# Patient Record
Sex: MALE | Race: WHITE
[De-identification: names, ages, dates, MRNs, and addresses within clinical notes are randomized per-mention and may not be internally consistent; named-entity substitution may affect disease eponyms.]

---

## 2019-01-27 ENCOUNTER — HOSPITAL ENCOUNTER (EMERGENCY)
Dept: HOSPITAL 42 - ED | Age: 34
LOS: 1 days | Discharge: HOME | End: 2019-01-28
Payer: SELF-PAY

## 2019-01-27 VITALS — RESPIRATION RATE: 18 BRPM

## 2019-01-27 VITALS — BODY MASS INDEX: 25.8 KG/M2

## 2019-01-27 DIAGNOSIS — F10.129: Primary | ICD-10-CM

## 2019-01-27 DIAGNOSIS — Z23: ICD-10-CM
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
## 2019-01-27 DIAGNOSIS — S61.412A: ICD-10-CM

## 2019-01-27 LAB
ALBUMIN SERPL-MCNC: 4.4 G/DL (ref 3–4.8)
ALBUMIN/GLOB SERPL: 1.4 {RATIO} (ref 1.1–1.8)
ALT SERPL-CCNC: 35 U/L (ref 7–56)
AST SERPL-CCNC: 29 U/L (ref 17–59)
BASOPHILS # BLD AUTO: 0.04 K/MM3 (ref 0–2)
BASOPHILS NFR BLD: 0.5 % (ref 0–3)
BUN SERPL-MCNC: 12 MG/DL (ref 7–21)
CALCIUM SERPL-MCNC: 8.8 MG/DL (ref 8.4–10.5)
EOSINOPHIL # BLD: 0.4 10*3/UL (ref 0–0.7)
EOSINOPHIL NFR BLD: 4.2 % (ref 1.5–5)
ERYTHROCYTE [DISTWIDTH] IN BLOOD BY AUTOMATED COUNT: 11.6 % (ref 11.5–14.5)
GFR NON-AFRICAN AMERICAN: > 60
HGB BLD-MCNC: 15.3 G/DL (ref 14–18)
LYMPHOCYTES # BLD: 5.5 10*3/UL (ref 1.2–3.4)
LYMPHOCYTES NFR BLD AUTO: 63.3 % (ref 22–35)
MCH RBC QN AUTO: 31.3 PG (ref 25–35)
MCHC RBC AUTO-ENTMCNC: 35.7 G/DL (ref 31–37)
MCV RBC AUTO: 87.5 FL (ref 80–105)
MONOCYTES # BLD AUTO: 0.3 10*3/UL (ref 0.1–0.6)
MONOCYTES NFR BLD: 3.2 % (ref 1–6)
PLATELET # BLD: 297 10^3/UL (ref 120–450)
PMV BLD AUTO: 9.1 FL (ref 7–11)
RBC # BLD AUTO: 4.89 10^6/UL (ref 3.5–6.1)
WBC # BLD AUTO: 8.7 10^3/UL (ref 4.5–11)

## 2019-01-27 PROCEDURE — 80074 ACUTE HEPATITIS PANEL: CPT

## 2019-01-27 PROCEDURE — 86592 SYPHILIS TEST NON-TREP QUAL: CPT

## 2019-01-27 PROCEDURE — 86703 HIV-1/HIV-2 1 RESULT ANTBDY: CPT

## 2019-01-27 PROCEDURE — 96372 THER/PROPH/DIAG INJ SC/IM: CPT

## 2019-01-27 PROCEDURE — 90715 TDAP VACCINE 7 YRS/> IM: CPT

## 2019-01-27 PROCEDURE — 85025 COMPLETE CBC W/AUTO DIFF WBC: CPT

## 2019-01-27 PROCEDURE — 96361 HYDRATE IV INFUSION ADD-ON: CPT

## 2019-01-27 PROCEDURE — 99284 EMERGENCY DEPT VISIT MOD MDM: CPT

## 2019-01-27 PROCEDURE — 80053 COMPREHEN METABOLIC PANEL: CPT

## 2019-01-27 PROCEDURE — 12002 RPR S/N/AX/GEN/TRNK2.6-7.5CM: CPT

## 2019-01-27 PROCEDURE — 96360 HYDRATION IV INFUSION INIT: CPT

## 2019-01-27 PROCEDURE — 86706 HEP B SURFACE ANTIBODY: CPT

## 2019-01-27 PROCEDURE — 90471 IMMUNIZATION ADMIN: CPT

## 2019-01-27 NOTE — PCM.PROC
Procedures


Attestation:: I certify that I have explained the specified Operation(s) or 

Procedure(s), risks, benefits and reasonable alternatives to the Patient and/or 

other person responsible. The opportunity was given to ask questions and all 

questions answered





- Laceration


  ** lidocaine 1% simple, single layer linear irrigated extensively left 4-0 

other local infiltration running


Site: hand


Side (if applicable): left


Size (cm): 3 (Tendon explored, no lac )


Description: linear


Depth: simple, single layer


Anesthesia used: lidocaine 1%


Anesthesia technique: local infiltration


Amount (mLs): 5


Pre-repair: wound explored, irrigated extensively


Skin layer closed with: other (Nylon)


Size: 4-0


Number of sutures: 1


Technique: running (Pt is intoxicated and uncooperative unable to obtain history

and unable to test for tendon laceration. )

## 2019-01-27 NOTE — ED PDOC
Physical Exam





Vital Signs











  Temp Pulse Resp BP Pulse Ox


 


 01/27/19 19:10   108 H  18  112/67  99


 


 01/27/19 17:41   76  18  98/64 L  98


 


 01/27/19 17:00  98 F  77  18  95/60 L  99














Medical Decision Making


ED Course and Treatment: 


01/27/19 20:00


Case endorsed to me by Dr. Mauricio, pending sobriety, wound repair as per his 

discussion with Dr. Hall, who had recommended surgical resident repair the wound 

and follow-up with him. Pt, whose past medical history includes alcohol abuse, 

who presented for alcohol intoxication and left hand laceration. Pt noted to be 

uncooperative/agitated on arrival. 





01/27/19 23:58


On re-evaluation, pt is awake, alert, and in no acute distress. Clinically 

sober, ambulating with steady gait. Explained need for follow-up with plastics 

this week with pt, who verbalizes understanding.





- Lab Interpretations


Lab Results: 





                                        











Total Bilirubin  0.3 mg/dL (0.2-1.3)   01/27/19  17:30    


 


AST  29 U/L (17-59)   01/27/19  17:30    


 


ALT  35 U/L (7-56)   01/27/19  17:30    


 


Alkaline Phosphatase  68 U/L ()   01/27/19  17:30    


 


Total Protein  7.7 g/dL (5.8-8.3)   01/27/19  17:30    


 


Albumin  4.4 g/dL (3.0-4.8)   01/27/19  17:30    


 


Globulin  3.2 gm/dL  01/27/19  17:30    


 


Albumin/Globulin Ratio  1.4  (1.1-1.8)   01/27/19  17:30    














- Medication Orders


Current Medication Orders: 














Discontinued Medications





Bacitracin (Bacitracin)  1 ea TOP ONCE ONE


   Stop: 01/27/19 19:23


   Last Admin: 01/27/19 20:35  Dose: 1 ea





Diphenhydramine HCl (Benadryl)  50 mg IM STAT STA


   Stop: 01/27/19 17:10


   Last Admin: 01/27/19 17:20  Dose: 50 mg





IM Administration Charges


 Document     01/27/19 17:20  SRE  (Rec: 01/27/19 17:20  SRE  [id number])


     Injection Site


      MAR Injection Site                         Left Vastus Lateralis


     Charges for Administration


      # of IM Administrations                    1





Sodium Chloride (Sodium Chloride 0.9%)  1,000 mls @ 999 mls/hr IV .Q1H1M STA


   Stop: 01/27/19 18:29


   Last Admin: 01/27/19 18:27  Dose: 999 mls/hr





eMAR Start Stop


 Document     01/27/19 18:27  SRE  (Rec: 01/27/19 18:27  SRE  [id number])


     Intravenous Solution


      Start Date                                 01/27/19


      Start Time                                 18:00


      End Date                                   01/27/19


      End time                                   19:00


      Total Infusion Time                        60





Sodium Chloride (Sodium Chloride 0.9%)  1,000 mls @ 999 mls/hr IV .Q1H1M STA


   Stop: 01/27/19 18:29


   Last Admin: 01/27/19 17:36  Dose: 999 mls/hr





eMAR Start Stop


 Document     01/27/19 17:36  SRE  (Rec: 01/27/19 17:36  SRE  [id number])


     Intravenous Solution


      Start Date                                 01/27/19


      Start Time                                 17:25


      End Date                                   01/27/19


      End time                                   18:25


      Total Infusion Time                        60





Lidocaine HCl (Lidocaine 1% (20ml))  20 ml IJ STAT STA


   Stop: 01/27/19 19:23


   Last Admin: 01/27/19 20:35  Dose: 1 ml


   Comments: adminsitered by Dr. Jane





Lorazepam (Ativan)  1 mg IM ONCE ONE; Protocol


   Stop: 01/27/19 17:10


   Last Admin: 01/27/19 17:18  Dose: 1 mg





IM Administration Charges


 Document     01/27/19 17:18  SRE  (Rec: 01/27/19 17:19  SRE  [id number])


     Injection Site


      MAR Injection Site                         Left Vastus Lateralis


     Charges for Administration


      # of IM Administrations                    1





Tetanus/Reduced Diphtheria/Acell Pertussis (Boostrix Vaccine Inj)  0.5 ml IM 

.ONCE ONE


   Stop: 01/27/19 18:12


   Last Admin: 01/27/19 19:20  Dose: 0.5 ml





Immunization Registry


 Document     01/27/19 19:20  IT  (Rec: 01/27/19 19:20  IT  [id number])


      [facility]-Date provided                          01/27/19


MAR Immunization Data


 Document     01/27/19 19:20  IT  (Rec: 01/27/19 19:20  IT  [id number])


     Immunization Data


      Vaccine Information Sheet Given            Yes





Ziprasidone (Geodon Inj)  20 mg IM STAT STA; Protocol


   Stop: 01/27/19 16:44


   Last Admin: 01/27/19 17:00  Dose: 20 mg





IM Administration Charges


 Document     01/27/19 17:00  SRE  (Rec: 01/27/19 17:12  SRE  [id number])


     Injection Site


      MAR Injection Site                         Right Vastus Lateralis


     Charges for Administration


      # of IM Administrations                    1














Disposition/Present on Arrival





- Present on Arrival


Any Indicators Present on Arrival: No


History of DVT/PE: No


History of Uncontrolled Diabetes: No


Urinary Catheter: No


History of Decub. Ulcer: No


History Surgical Site Infection Following: None





- Disposition


Have Diagnosis and Disposition been Completed?: Yes


Diagnosis: 


 Alcohol intoxication, Hand laceration





Disposition: HOME/ ROUTINE


Disposition Time: 00:10


Condition: STABLE


Discharge Instructions (ExitCare):  Laceration Repair With Stitches (DC), 

Alcohol Abuse and Alcoholism (DC)


Additional Instructions: 


Keep hand wound clean and dry/Medication as prescribed 


Must follow up at hand surgeon Dr. Hall's office this week to get hand evaluated 

for any tendon damage and stitches removed. 


Prescriptions: 


Cephalexin [cephalexin] 500 mg PO BID #14 cap


Referrals: 


Syed Hall MD [Staff Provider] - Follow up with primary


Forms:  [employer] (English)

## 2019-01-27 NOTE — ED PDOC
Arrival/HPI





- General


Chief Complaint: Abnormal Skin Integrity


Time Seen by Provider: 01/27/19 16:43





- History of Present Illness


Narrative History of Present Illness (Text): 





01/27/19 17:01


33 m brought in by ambulance for alcohol intoxication and laceration to left 

hand. As per nursing report, the patient was reported to be at work, cut himself

at work, went home and put coffee grinds to attempt to stop the bleeding, waited

two hours and then called 911 for medical attention because the bleeding didn't 

stop. Patient arrived in the ED awake and alert, intoxicated, uncooperative, had

a large dressing over the left hand. Patient was triaged to a bed, became 

excessively agitated, began throwing items and exposing blood to others. A code 

gray was called and patient brought to Iso room.





No PMD








Past Medical History





- Provider Review


Nursing Documentation Reviewed: Yes





- Infectious Disease


Hx of Infectious Diseases: None





- Tetanus Immunization


Tetanus Immunization: Unknown





- Past Medical History


Past Medical History: No Previous





- Cardiac


Hx Cardiac Disorders: No





- Pulmonary


Hx Respiratory Disorders: No





- Neurological


Hx Neurological Disorder: No





- HEENT


Hx HEENT Disorder: No





- Renal


Hx Renal Disorder: No





- Endocrine/Metabolic


Hx Endocrine Disorders: No





- Hematological/Oncological


Hx Blood Disorders: No





- Integumentary


Hx Dermatological Disorder: No





- Musculoskeletal/Rheumatological


Hx Musculoskeletal Disorders: No





- Gastrointestinal


Hx Gastrointestinal Disorders: No





- Genitourinary/Gynecological


Hx Genitourinary Disorders: No





- Psychiatric


Hx Depression: No (unable to obtain)


Hx Emotional Abuse: No (unable to obtain)


Hx Physical Abuse: No (unable to obtain)


Hx Substance Use: No (unable to obtain)





- Past Surgical History


Past Surgical History: No Previous





- Anesthesia


Hx Anesthesia: No


Hx Anesthesia Reactions: No


Hx Malignant Hyperthermia: No





- Suicidal Assessment


Feels Threatened In Home Enviroment: No





Family/Social History





- Physician Review


Nursing Documentation Reviewed: Yes


Family/Social History: No Known Family HX


Smoking Status: Current Some Days Smoker


Hx Alcohol Use: Yes


Hx Substance Use: No (unable to obtain)


Hx Substance Use Treatment: No (unable to obtain)





Allergies/Home Meds


Allergies/Adverse Reactions: 


Allergies





No Known Allergies Allergy (Verified 05/17/15 01:00)


   











Review of Systems





- Review of Systems


Systems not reviewed;Unavailable: Uncooperative





Medical Decision Making


ED Course and Treatment: 





01/27/19 17:10


patient is still yelling excessively and uncooperate, will add more medication 

to facilitate medical evaluation including full assessment of hand injury.


01/27/19 19:21


case discussed with dr. del toro, plastics, accepts consult and would like surgical 

resident to see patient.


case d/w surgical resident, will come to ED for eval.


case endorsed to dr. gonzalez.





- Medication Orders


Current Medication Orders: 














Discontinued Medications





Ziprasidone (Geodon Inj)  20 mg IM STAT STA; Protocol


   Stop: 01/27/19 16:44











Disposition/Present on Arrival





- Present on Arrival


Any Indicators Present on Arrival: No


History of DVT/PE: No


History of Uncontrolled Diabetes: No


Urinary Catheter: No


History of Decub. Ulcer: No


History Surgical Site Infection Following: None





- Disposition


Have Diagnosis and Disposition been Completed?: Yes


Diagnosis: 


 Alcohol intoxication, Hand laceration





Disposition: HOME/ ROUTINE


Disposition Time: 20:01


Condition: STABLE


Discharge Instructions (ExitCare):  Laceration Repair With Stitches (DC), 

Alcohol Abuse and Alcoholism (DC)


Additional Instructions: 


Keep hand wound clean and dry/Medication as prescribed 


Must follow up at hand surgeon Dr. Del Toro's office this week to get hand evaluated 

for any tendon damage and stitches removed. 


Prescriptions: 


Cephalexin [cephalexin] 500 mg PO BID #14 cap


Referrals: 


Syed Del Toro MD [Staff Provider] - Follow up with primary


Forms:  [employer] (English)

## 2019-01-28 VITALS
DIASTOLIC BLOOD PRESSURE: 78 MMHG | SYSTOLIC BLOOD PRESSURE: 115 MMHG | TEMPERATURE: 98.6 F | OXYGEN SATURATION: 98 % | HEART RATE: 88 BPM

## 2019-01-28 LAB
HEPATITIS A IGM: NEGATIVE
HEPATITIS B CORE AB: NEGATIVE
HEPATITIS C ANTIBODY: NEGATIVE